# Patient Record
Sex: FEMALE | Race: BLACK OR AFRICAN AMERICAN | NOT HISPANIC OR LATINO | Employment: FULL TIME | URBAN - METROPOLITAN AREA
[De-identification: names, ages, dates, MRNs, and addresses within clinical notes are randomized per-mention and may not be internally consistent; named-entity substitution may affect disease eponyms.]

---

## 2022-06-23 ENCOUNTER — HOSPITAL ENCOUNTER (OUTPATIENT)
Dept: DATA CONVERSION | Facility: HOSPITAL | Age: 42
Discharge: HOME | End: 2022-06-23
Attending: EMERGENCY MEDICINE
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
## 2022-06-23 DIAGNOSIS — M54.50 LOW BACK PAIN, UNSPECIFIED: ICD-10-CM

## 2022-06-23 DIAGNOSIS — S39.012A STRAIN OF MUSCLE, FASCIA AND TENDON OF LOWER BACK, INITIAL ENCOUNTER: Primary | ICD-10-CM

## 2022-06-23 LAB — HCG UR QL: NEGATIVE

## 2023-05-09 ENCOUNTER — OFFICE VISIT (OUTPATIENT)
Dept: PRIMARY CARE | Facility: CLINIC | Age: 43
End: 2023-05-09
Payer: COMMERCIAL

## 2023-05-09 VITALS
SYSTOLIC BLOOD PRESSURE: 126 MMHG | HEART RATE: 88 BPM | WEIGHT: 214.13 LBS | BODY MASS INDEX: 32.56 KG/M2 | OXYGEN SATURATION: 100 % | DIASTOLIC BLOOD PRESSURE: 70 MMHG

## 2023-05-09 DIAGNOSIS — E66.09 CLASS 1 OBESITY DUE TO EXCESS CALORIES WITHOUT SERIOUS COMORBIDITY WITH BODY MASS INDEX (BMI) OF 32.0 TO 32.9 IN ADULT: ICD-10-CM

## 2023-05-09 DIAGNOSIS — G89.29 CHRONIC LOW BACK PAIN WITH SCIATICA, SCIATICA LATERALITY UNSPECIFIED, UNSPECIFIED BACK PAIN LATERALITY: Primary | ICD-10-CM

## 2023-05-09 DIAGNOSIS — K21.9 GASTROESOPHAGEAL REFLUX DISEASE WITHOUT ESOPHAGITIS: ICD-10-CM

## 2023-05-09 DIAGNOSIS — M54.40 CHRONIC LOW BACK PAIN WITH SCIATICA, SCIATICA LATERALITY UNSPECIFIED, UNSPECIFIED BACK PAIN LATERALITY: Primary | ICD-10-CM

## 2023-05-09 PROBLEM — R10.11 ABDOMINAL PAIN, RUQ: Status: ACTIVE | Noted: 2023-05-09

## 2023-05-09 PROBLEM — M51.369 DDD (DEGENERATIVE DISC DISEASE), LUMBAR: Status: ACTIVE | Noted: 2023-05-09

## 2023-05-09 PROBLEM — H92.03 OTALGIA, BILATERAL: Status: ACTIVE | Noted: 2023-05-09

## 2023-05-09 PROBLEM — N91.1 AMENORRHEA, SECONDARY: Status: ACTIVE | Noted: 2023-05-09

## 2023-05-09 PROBLEM — L29.9 EAR ITCH: Status: ACTIVE | Noted: 2023-05-09

## 2023-05-09 PROBLEM — N97.9 FEMALE INFERTILITY: Status: ACTIVE | Noted: 2023-05-09

## 2023-05-09 PROBLEM — H53.8 BLURRED VISION: Status: ACTIVE | Noted: 2023-05-09

## 2023-05-09 PROBLEM — N92.6 IRREGULAR MENSTRUAL CYCLE: Status: ACTIVE | Noted: 2023-05-09

## 2023-05-09 PROBLEM — S93.409A ANKLE SPRAIN: Status: ACTIVE | Noted: 2023-05-09

## 2023-05-09 PROBLEM — F41.1 ANXIETY, GENERALIZED: Status: ACTIVE | Noted: 2023-05-09

## 2023-05-09 PROBLEM — E83.51 HYPOCALCEMIA: Status: ACTIVE | Noted: 2023-05-09

## 2023-05-09 PROBLEM — L30.9 ECZEMATOUS DERMATITIS: Status: ACTIVE | Noted: 2023-05-09

## 2023-05-09 PROBLEM — M54.50 CHRONIC RIGHT-SIDED LOW BACK PAIN WITHOUT SCIATICA: Status: ACTIVE | Noted: 2023-05-09

## 2023-05-09 PROBLEM — M54.17 LUMBOSACRAL RADICULOPATHY AT S1: Status: ACTIVE | Noted: 2023-05-09

## 2023-05-09 PROBLEM — K58.9 IRRITABLE BOWEL SYNDROME WITHOUT DIARRHEA: Status: ACTIVE | Noted: 2023-05-09

## 2023-05-09 PROBLEM — M54.12 CERVICAL RADICULITIS: Status: ACTIVE | Noted: 2023-05-09

## 2023-05-09 PROBLEM — H61.23 BILATERAL IMPACTED CERUMEN: Status: ACTIVE | Noted: 2019-12-24

## 2023-05-09 PROBLEM — N64.4 BREAST PAIN, RIGHT: Status: ACTIVE | Noted: 2023-05-09

## 2023-05-09 PROBLEM — M79.18 MYOFASCIAL PAIN: Status: ACTIVE | Noted: 2023-05-09

## 2023-05-09 PROBLEM — M53.3 SACROILIAC JOINT DYSFUNCTION OF BOTH SIDES: Status: ACTIVE | Noted: 2023-05-09

## 2023-05-09 PROBLEM — K59.09 CHRONIC CONSTIPATION: Status: ACTIVE | Noted: 2023-05-09

## 2023-05-09 PROBLEM — M75.101 ROTATOR CUFF SYNDROME OF RIGHT SHOULDER: Status: ACTIVE | Noted: 2023-05-09

## 2023-05-09 PROBLEM — H92.09 REFERRED OTALGIA: Status: ACTIVE | Noted: 2019-12-24

## 2023-05-09 PROBLEM — M62.81 MUSCLE WEAKNESS (GENERALIZED): Status: ACTIVE | Noted: 2023-05-09

## 2023-05-09 PROBLEM — M26.629 TEMPOROMANDIBULAR JOINT-PAIN-DYSFUNCTION SYNDROME: Status: ACTIVE | Noted: 2019-12-24

## 2023-05-09 PROBLEM — S43.431A SUPERIOR GLENOID LABRUM LESION OF RIGHT SHOULDER: Status: ACTIVE | Noted: 2023-05-09

## 2023-05-09 PROBLEM — R92.8 ABNORMAL MAMMOGRAM: Status: ACTIVE | Noted: 2023-05-09

## 2023-05-09 PROBLEM — M75.51 SCAPULOTHORACIC BURSITIS OF RIGHT SHOULDER: Status: ACTIVE | Noted: 2023-05-09

## 2023-05-09 PROBLEM — G25.89 SCAPULAR DYSKINESIS: Status: ACTIVE | Noted: 2023-05-09

## 2023-05-09 PROBLEM — R70.0 ELEVATED SEDIMENTATION RATE: Status: ACTIVE | Noted: 2023-05-09

## 2023-05-09 PROBLEM — S43.439A: Status: ACTIVE | Noted: 2023-05-09

## 2023-05-09 PROBLEM — M51.36 DDD (DEGENERATIVE DISC DISEASE), LUMBAR: Status: ACTIVE | Noted: 2023-05-09

## 2023-05-09 PROBLEM — R76.8 ELEVATED ANTINUCLEAR ANTIBODY (ANA) LEVEL: Status: ACTIVE | Noted: 2023-05-09

## 2023-05-09 PROBLEM — R73.9 HYPERGLYCEMIA: Status: ACTIVE | Noted: 2023-05-09

## 2023-05-09 PROBLEM — M54.9 BACK PAIN: Status: ACTIVE | Noted: 2023-05-09

## 2023-05-09 PROBLEM — M25.511 PAIN IN RIGHT SHOULDER: Status: ACTIVE | Noted: 2023-05-09

## 2023-05-09 PROBLEM — J18.9 CAP (COMMUNITY ACQUIRED PNEUMONIA): Status: ACTIVE | Noted: 2023-05-09

## 2023-05-09 PROBLEM — M54.16 LEFT LUMBAR RADICULOPATHY: Status: ACTIVE | Noted: 2023-05-09

## 2023-05-09 PROBLEM — H61.21 EXCESSIVE CERUMEN IN RIGHT EAR CANAL: Status: ACTIVE | Noted: 2023-05-09

## 2023-05-09 PROBLEM — E55.9 VITAMIN D DEFICIENCY: Status: ACTIVE | Noted: 2023-05-09

## 2023-05-09 PROBLEM — N94.6 DYSMENORRHEA: Status: ACTIVE | Noted: 2023-05-09

## 2023-05-09 PROBLEM — R51.9 HEADACHE: Status: ACTIVE | Noted: 2023-05-09

## 2023-05-09 PROBLEM — R07.9 CHEST PAIN: Status: ACTIVE | Noted: 2023-05-09

## 2023-05-09 PROBLEM — R79.89 HIGH SERUM LOW-DENSITY LIPOPROTEIN (LDL): Status: ACTIVE | Noted: 2023-05-09

## 2023-05-09 PROBLEM — K59.00 CONSTIPATION: Status: ACTIVE | Noted: 2023-05-09

## 2023-05-09 PROBLEM — M25.572 LEFT ANKLE PAIN: Status: ACTIVE | Noted: 2023-05-09

## 2023-05-09 PROBLEM — N92.0 MENORRHAGIA WITH REGULAR CYCLE: Status: ACTIVE | Noted: 2023-05-09

## 2023-05-09 PROCEDURE — 99214 OFFICE O/P EST MOD 30 MIN: CPT | Performed by: INTERNAL MEDICINE

## 2023-05-09 PROCEDURE — 1036F TOBACCO NON-USER: CPT | Performed by: INTERNAL MEDICINE

## 2023-05-09 PROCEDURE — 3008F BODY MASS INDEX DOCD: CPT | Performed by: INTERNAL MEDICINE

## 2023-05-09 RX ORDER — IBUPROFEN 600 MG/1
TABLET ORAL EVERY 6 HOURS
COMMUNITY
Start: 2022-10-14 | End: 2023-08-07 | Stop reason: ALTCHOICE

## 2023-05-09 RX ORDER — CALCIUM CARBONATE 200(500)MG
TABLET,CHEWABLE ORAL
COMMUNITY
End: 2023-05-09

## 2023-05-09 RX ORDER — OMEPRAZOLE 20 MG/1
20 CAPSULE, DELAYED RELEASE ORAL DAILY
COMMUNITY
Start: 2022-11-25

## 2023-05-09 RX ORDER — SERTRALINE HYDROCHLORIDE 50 MG/1
TABLET, FILM COATED ORAL
COMMUNITY
Start: 2022-11-25 | End: 2023-05-09

## 2023-05-09 RX ORDER — NAPROXEN 500 MG/1
1 TABLET ORAL EVERY 12 HOURS
COMMUNITY
Start: 2022-06-03 | End: 2023-08-07

## 2023-05-09 RX ORDER — LIDOCAINE 560 MG/1
1 PATCH PERCUTANEOUS; TOPICAL; TRANSDERMAL
Qty: 5 PATCH | Refills: 0 | COMMUNITY
Start: 2023-05-07 | End: 2023-05-12

## 2023-05-09 RX ORDER — CLOMIPHENE CITRATE 50 MG/1
TABLET ORAL
COMMUNITY
Start: 2022-11-10 | End: 2023-05-09

## 2023-05-09 RX ORDER — DEXTROMETHORPHAN HYDROBROMIDE, GUAIFENESIN 5; 100 MG/5ML; MG/5ML
LIQUID ORAL EVERY 8 HOURS
COMMUNITY
Start: 2022-10-14 | End: 2023-05-09

## 2023-05-09 RX ORDER — GABAPENTIN 100 MG/1
1 CAPSULE ORAL 2 TIMES DAILY
COMMUNITY
Start: 2022-10-14 | End: 2024-04-18 | Stop reason: ALTCHOICE

## 2023-05-09 RX ORDER — DULOXETIN HYDROCHLORIDE 30 MG/1
60 CAPSULE, DELAYED RELEASE ORAL
Qty: 60 CAPSULE | Refills: 0 | COMMUNITY
Start: 2023-05-01 | End: 2023-08-07 | Stop reason: ALTCHOICE

## 2023-05-09 RX ORDER — FLUOCINOLONE ACETONIDE 0.11 MG/ML
OIL AURICULAR (OTIC)
COMMUNITY
Start: 2022-09-27 | End: 2023-05-09

## 2023-05-09 RX ORDER — IBUPROFEN 200 MG
TABLET ORAL 2 TIMES DAILY PRN
COMMUNITY

## 2023-05-09 RX ORDER — TRAZODONE HYDROCHLORIDE 50 MG/1
50 TABLET ORAL NIGHTLY
COMMUNITY
Start: 2022-11-25 | End: 2023-05-09

## 2023-05-09 RX ORDER — CYCLOBENZAPRINE HCL 10 MG
TABLET ORAL
COMMUNITY
Start: 2023-05-08

## 2023-05-09 RX ORDER — GABAPENTIN 300 MG/1
1 CAPSULE ORAL 3 TIMES DAILY
COMMUNITY
Start: 2022-07-07 | End: 2023-08-08 | Stop reason: SDUPTHER

## 2023-05-09 NOTE — PROGRESS NOTES
Subjective   Patient ID: Roula Song is a 42 y.o. female who presents for GERD and Muscle soreness (From shoulders to the back).  HPI    Patient presents for acute visit.  She complains of continued back pain with neuropathy.  She has completed physical therapy and is currently seeing pain management.  She was prescribed medications which she has not started yet.  No bowel or urinary incontinence    GERD stable on therapy no side effects    Diet and exercise reviewed    Review of Systems   All other systems reviewed and are negative.      Objective   /70   Pulse 88   Wt 97.1 kg (214 lb 2 oz)   SpO2 100%   BMI 32.56 kg/m²   Lab Results   Component Value Date    WBC 8.0 01/12/2023    HGB 11.6 (L) 01/12/2023    HCT 35.5 (L) 01/12/2023     01/12/2023    CHOL 223 (H) 01/12/2023    TRIG 83 01/12/2023    HDL 80.2 01/12/2023    ALT 11 01/12/2023    AST 14 01/12/2023     01/12/2023    K 4.0 01/12/2023     01/12/2023    CREATININE 0.81 01/12/2023    BUN 13 01/12/2023    CO2 30 01/12/2023    TSH 0.92 01/12/2023           Physical Exam  Vitals reviewed.   Constitutional:       Appearance: Normal appearance. She is obese.   HENT:      Head: Normocephalic and atraumatic.      Mouth/Throat:      Pharynx: No posterior oropharyngeal erythema.   Eyes:      General: No scleral icterus.     Conjunctiva/sclera: Conjunctivae normal.      Pupils: Pupils are equal, round, and reactive to light.   Cardiovascular:      Rate and Rhythm: Normal rate and regular rhythm.      Heart sounds: Normal heart sounds.   Pulmonary:      Effort: No respiratory distress.      Breath sounds: No wheezing.   Abdominal:      General: Abdomen is flat. Bowel sounds are normal. There is no distension.      Palpations: Abdomen is soft. There is no mass.      Tenderness: There is no abdominal tenderness. There is no rebound.   Musculoskeletal:         General: Normal range of motion.      Cervical back: Normal range of motion and  neck supple.   Skin:     General: Skin is warm and dry.   Neurological:      General: No focal deficit present.      Mental Status: She is alert and oriented to person, place, and time. Mental status is at baseline.   Psychiatric:         Mood and Affect: Mood normal.         Behavior: Behavior normal.         Thought Content: Thought content normal.         Judgment: Judgment normal.         Problem List Items Addressed This Visit          Digestive    GERD (gastroesophageal reflux disease)       Other    Back pain - Primary     Other Visit Diagnoses       Class 1 obesity due to excess calories without serious comorbidity with body mass index (BMI) of 32.0 to 32.9 in adult              Assessment/Plan     She complains of continued back pain with neuropathy.  She has completed physical therapy and is currently seeing pain management.  She was prescribed medications which she has not started yet.    GERD stable on therapy no side effects    Diet and exercise reviewed    Follow up pending

## 2023-08-07 ENCOUNTER — OFFICE VISIT (OUTPATIENT)
Dept: PRIMARY CARE | Facility: CLINIC | Age: 43
End: 2023-08-07
Payer: COMMERCIAL

## 2023-08-07 VITALS
HEART RATE: 107 BPM | DIASTOLIC BLOOD PRESSURE: 76 MMHG | WEIGHT: 219.4 LBS | OXYGEN SATURATION: 99 % | SYSTOLIC BLOOD PRESSURE: 118 MMHG | BODY MASS INDEX: 33.36 KG/M2

## 2023-08-07 DIAGNOSIS — G89.29 CHRONIC RIGHT SHOULDER PAIN: ICD-10-CM

## 2023-08-07 DIAGNOSIS — M51.37 DDD (DEGENERATIVE DISC DISEASE), LUMBOSACRAL: Primary | ICD-10-CM

## 2023-08-07 DIAGNOSIS — M25.511 CHRONIC RIGHT SHOULDER PAIN: ICD-10-CM

## 2023-08-07 DIAGNOSIS — J30.9 ALLERGIC RHINITIS, UNSPECIFIED SEASONALITY, UNSPECIFIED TRIGGER: ICD-10-CM

## 2023-08-07 DIAGNOSIS — K21.9 GASTROESOPHAGEAL REFLUX DISEASE WITHOUT ESOPHAGITIS: ICD-10-CM

## 2023-08-07 DIAGNOSIS — E66.09 CLASS 1 OBESITY DUE TO EXCESS CALORIES WITHOUT SERIOUS COMORBIDITY WITH BODY MASS INDEX (BMI) OF 33.0 TO 33.9 IN ADULT: ICD-10-CM

## 2023-08-07 PROBLEM — M48.061 SPINAL STENOSIS OF LUMBAR REGION WITHOUT NEUROGENIC CLAUDICATION: Status: ACTIVE | Noted: 2023-08-07

## 2023-08-07 PROBLEM — R22.1 NECK FULLNESS: Status: ACTIVE | Noted: 2023-08-07

## 2023-08-07 PROBLEM — O02.1 MISSED ABORTION (HHS-HCC): Status: ACTIVE | Noted: 2021-12-23

## 2023-08-07 PROBLEM — M51.26 HERNIATED LUMBAR INTERVERTEBRAL DISC: Status: ACTIVE | Noted: 2022-11-02

## 2023-08-07 PROBLEM — D64.9 ANEMIA: Status: ACTIVE | Noted: 2023-08-07

## 2023-08-07 PROBLEM — R59.9 ENLARGED LYMPH NODES, UNSPECIFIED: Status: ACTIVE | Noted: 2022-09-01

## 2023-08-07 PROBLEM — H60.311 ACUTE DIFFUSE OTITIS EXTERNA OF RIGHT EAR: Status: ACTIVE | Noted: 2023-08-07

## 2023-08-07 PROBLEM — E78.00 HYPERCHOLESTEROLEMIA: Status: ACTIVE | Noted: 2023-08-07

## 2023-08-07 PROBLEM — M54.16 CHRONIC LUMBAR RADICULOPATHY: Status: ACTIVE | Noted: 2023-08-07

## 2023-08-07 PROBLEM — M51.24 THORACIC DISC HERNIATION: Status: ACTIVE | Noted: 2023-08-07

## 2023-08-07 PROCEDURE — 3008F BODY MASS INDEX DOCD: CPT | Performed by: INTERNAL MEDICINE

## 2023-08-07 PROCEDURE — 1036F TOBACCO NON-USER: CPT | Performed by: INTERNAL MEDICINE

## 2023-08-07 PROCEDURE — 99214 OFFICE O/P EST MOD 30 MIN: CPT | Performed by: INTERNAL MEDICINE

## 2023-08-07 RX ORDER — NEOMYCIN SULFATE, POLYMYXIN B SULFATE AND HYDROCORTISONE 10; 3.5; 1 MG/ML; MG/ML; [USP'U]/ML
SUSPENSION/ DROPS AURICULAR (OTIC)
COMMUNITY
Start: 2023-07-22 | End: 2024-04-18 | Stop reason: SDUPTHER

## 2023-08-07 RX ORDER — DEXTROMETHORPHAN HYDROBROMIDE, GUAIFENESIN 5; 100 MG/5ML; MG/5ML
1 LIQUID ORAL AS NEEDED
COMMUNITY
Start: 2022-10-14

## 2023-08-07 RX ORDER — METHYLPREDNISOLONE 4 MG/1
TABLET ORAL
Qty: 21 TABLET | Refills: 0 | Status: SHIPPED | OUTPATIENT
Start: 2023-08-07 | End: 2024-04-18 | Stop reason: ALTCHOICE

## 2023-08-07 RX ORDER — FLUOCINOLONE ACETONIDE 0.11 MG/ML
OIL AURICULAR (OTIC)
COMMUNITY
Start: 2022-09-27 | End: 2024-04-18 | Stop reason: ALTCHOICE

## 2023-08-07 ASSESSMENT — ENCOUNTER SYMPTOMS: BACK PAIN: 1
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
## 2023-08-08 RX ORDER — GABAPENTIN 300 MG/1
300 CAPSULE ORAL 2 TIMES DAILY
Qty: 60 CAPSULE | Refills: 0 | Status: SHIPPED | OUTPATIENT
Start: 2023-08-08 | End: 2024-05-01

## 2023-10-27 ENCOUNTER — APPOINTMENT (OUTPATIENT)
Dept: NEUROLOGY | Facility: CLINIC | Age: 43
End: 2023-10-27
Payer: COMMERCIAL

## 2024-02-08 ENCOUNTER — APPOINTMENT (OUTPATIENT)
Dept: ORTHOPEDIC SURGERY | Facility: HOSPITAL | Age: 44
End: 2024-02-08
Payer: COMMERCIAL

## 2024-04-18 ENCOUNTER — OFFICE VISIT (OUTPATIENT)
Dept: PRIMARY CARE | Facility: CLINIC | Age: 44
End: 2024-04-18
Payer: COMMERCIAL

## 2024-04-18 VITALS
BODY MASS INDEX: 33.39 KG/M2 | DIASTOLIC BLOOD PRESSURE: 78 MMHG | WEIGHT: 219.6 LBS | HEART RATE: 103 BPM | SYSTOLIC BLOOD PRESSURE: 124 MMHG | OXYGEN SATURATION: 99 %

## 2024-04-18 DIAGNOSIS — M54.16 LEFT LUMBAR RADICULOPATHY: Primary | ICD-10-CM

## 2024-04-18 DIAGNOSIS — M25.511 CHRONIC RIGHT SHOULDER PAIN: ICD-10-CM

## 2024-04-18 DIAGNOSIS — E66.09 CLASS 1 OBESITY DUE TO EXCESS CALORIES WITHOUT SERIOUS COMORBIDITY WITH BODY MASS INDEX (BMI) OF 33.0 TO 33.9 IN ADULT: ICD-10-CM

## 2024-04-18 DIAGNOSIS — G89.29 CHRONIC RIGHT SHOULDER PAIN: ICD-10-CM

## 2024-04-18 DIAGNOSIS — N97.1 INFERTILITY OF TUBAL ORIGIN: ICD-10-CM

## 2024-04-18 DIAGNOSIS — H60.63 CHRONIC OTITIS EXTERNA OF BOTH EARS, UNSPECIFIED TYPE: ICD-10-CM

## 2024-04-18 PROBLEM — M67.919 DISORDER OF ROTATOR CUFF: Status: ACTIVE | Noted: 2024-04-18

## 2024-04-18 PROBLEM — R20.2 PARESTHESIA OF FOOT: Status: ACTIVE | Noted: 2024-04-18

## 2024-04-18 PROBLEM — E66.811 OBESITY, CLASS I, BMI 30-34.9: Status: ACTIVE | Noted: 2024-01-22

## 2024-04-18 PROBLEM — E66.9 OBESITY, CLASS I, BMI 30-34.9: Status: ACTIVE | Noted: 2024-01-22

## 2024-04-18 PROBLEM — K42.0 UMBILICAL HERNIA WITH OBSTRUCTION BUT NO GANGRENE: Status: ACTIVE | Noted: 2024-04-18

## 2024-04-18 PROBLEM — F43.22 ADJUSTMENT DISORDER WITH ANXIOUS MOOD: Status: ACTIVE | Noted: 2024-04-18

## 2024-04-18 PROBLEM — R53.1 ASTHENIA: Status: ACTIVE | Noted: 2024-04-18

## 2024-04-18 PROBLEM — O24.419 GESTATIONAL DIABETES (HHS-HCC): Status: ACTIVE | Noted: 2024-04-18

## 2024-04-18 PROBLEM — G81.90 HEMIPARESIS (MULTI): Status: ACTIVE | Noted: 2024-04-18

## 2024-04-18 PROBLEM — G62.9 NEUROPATHY: Status: ACTIVE | Noted: 2024-04-18

## 2024-04-18 PROBLEM — R06.00 DYSPNEA: Status: ACTIVE | Noted: 2024-04-18

## 2024-04-18 PROBLEM — E66.9 OBESITY DUE TO ENERGY IMBALANCE: Status: ACTIVE | Noted: 2024-04-18

## 2024-04-18 PROBLEM — N91.2 AMENORRHEA: Status: ACTIVE | Noted: 2023-05-09

## 2024-04-18 PROBLEM — N93.9 ABNORMAL VAGINAL BLEEDING: Status: ACTIVE | Noted: 2024-04-18

## 2024-04-18 PROBLEM — R10.9 STOMACH CRAMPS: Status: ACTIVE | Noted: 2024-04-18

## 2024-04-18 PROBLEM — R11.2 NAUSEA & VOMITING: Status: ACTIVE | Noted: 2024-04-18

## 2024-04-18 PROBLEM — H60.509 ACUTE OTITIS EXTERNA: Status: ACTIVE | Noted: 2023-08-07

## 2024-04-18 PROBLEM — F41.0 PANIC ATTACK: Status: ACTIVE | Noted: 2023-05-09

## 2024-04-18 PROBLEM — N39.0 URINARY TRACT INFECTION: Status: ACTIVE | Noted: 2024-04-18

## 2024-04-18 PROBLEM — J30.9 ALLERGIC RHINITIS: Status: ACTIVE | Noted: 2024-04-18

## 2024-04-18 PROBLEM — N92.0 MENSTRUAL SPOTTING: Status: ACTIVE | Noted: 2024-04-18

## 2024-04-18 PROBLEM — J32.9 SINUSITIS: Status: ACTIVE | Noted: 2024-04-18

## 2024-04-18 PROBLEM — S93.409A SPRAIN OF ANKLE: Status: ACTIVE | Noted: 2022-06-23

## 2024-04-18 PROBLEM — B01.9 VARICELLA: Status: ACTIVE | Noted: 2024-04-18

## 2024-04-18 PROCEDURE — 3074F SYST BP LT 130 MM HG: CPT | Performed by: INTERNAL MEDICINE

## 2024-04-18 PROCEDURE — 1036F TOBACCO NON-USER: CPT | Performed by: INTERNAL MEDICINE

## 2024-04-18 PROCEDURE — 3008F BODY MASS INDEX DOCD: CPT | Performed by: INTERNAL MEDICINE

## 2024-04-18 PROCEDURE — 99214 OFFICE O/P EST MOD 30 MIN: CPT | Performed by: INTERNAL MEDICINE

## 2024-04-18 PROCEDURE — 3078F DIAST BP <80 MM HG: CPT | Performed by: INTERNAL MEDICINE

## 2024-04-18 RX ORDER — PANTOPRAZOLE SODIUM 40 MG/1
40 TABLET, DELAYED RELEASE ORAL 2 TIMES DAILY
COMMUNITY
Start: 2024-03-04

## 2024-04-18 RX ORDER — NEOMYCIN SULFATE, POLYMYXIN B SULFATE AND HYDROCORTISONE 10; 3.5; 1 MG/ML; MG/ML; [USP'U]/ML
4 SUSPENSION/ DROPS AURICULAR (OTIC) 3 TIMES DAILY
Qty: 6 ML | Refills: 0 | Status: SHIPPED | OUTPATIENT
Start: 2024-04-18 | End: 2024-05-01

## 2024-04-18 NOTE — PROGRESS NOTES
Subjective   Patient ID: Roula Song is a 43 y.o. female who presents for Follow-up.  HPI    Same day sick    herniated disk with left leg neuropathy   She has completed PT with no significant improvement.    Spine surgery following  Not using Neurontin regularly    Increase Neurontin 300 mg twice daily.  Risk / benefits rev'd    Chronic rt shoulder pain    Ortho following    Otitis externa  Using neomycin / polymyxin / HC as directed    Infertility tubal issue  Seeing OB/GYN  Needs surgery / pt declined     GERD stable on therapy no side effects     Diet and exercise reviewed    Review of Systems   All other systems reviewed and are negative.      Objective   /78   Pulse 103   Wt 99.6 kg (219 lb 9.6 oz)   SpO2 99%   BMI 33.39 kg/m²   Lab Results   Component Value Date    WBC 8.0 01/12/2023    HGB 11.6 (L) 01/12/2023    HCT 35.5 (L) 01/12/2023     01/12/2023    CHOL 223 (H) 01/12/2023    TRIG 83 01/12/2023    HDL 80.2 01/12/2023    ALT 11 01/12/2023    AST 14 01/12/2023     01/12/2023    K 4.0 01/12/2023     01/12/2023    CREATININE 0.81 01/12/2023    BUN 13 01/12/2023    CO2 30 01/12/2023    TSH 0.92 01/12/2023    INR 0.9 07/28/2018    HGBA1C 5.3 11/28/2023           Physical Exam  Vitals reviewed.   Constitutional:       Appearance: Normal appearance. She is obese.   HENT:      Head: Normocephalic and atraumatic.      Mouth/Throat:      Pharynx: No posterior oropharyngeal erythema.   Eyes:      General: No scleral icterus.     Conjunctiva/sclera: Conjunctivae normal.      Pupils: Pupils are equal, round, and reactive to light.   Cardiovascular:      Rate and Rhythm: Normal rate and regular rhythm.      Heart sounds: Normal heart sounds.   Pulmonary:      Effort: No respiratory distress.      Breath sounds: No wheezing.   Abdominal:      General: Abdomen is flat. Bowel sounds are normal. There is no distension.      Palpations: Abdomen is soft. There is no mass.      Tenderness:  There is no abdominal tenderness. There is no rebound.   Musculoskeletal:         General: Normal range of motion.      Cervical back: Normal range of motion and neck supple.   Skin:     General: Skin is warm and dry.   Neurological:      General: No focal deficit present.      Mental Status: She is alert and oriented to person, place, and time. Mental status is at baseline.   Psychiatric:         Mood and Affect: Mood normal.         Behavior: Behavior normal.         Thought Content: Thought content normal.         Judgment: Judgment normal.         Problem List Items Addressed This Visit             ICD-10-CM    Left lumbar radiculopathy - Primary M54.16    Pain in right shoulder M25.511     Other Visit Diagnoses         Codes    Chronic otitis externa of both ears, unspecified type     H60.63    Relevant Medications    neomycin-polymyxin-HC (Cortisporin) 3.5-10,000-1 mg/mL-unit/mL-% otic suspension    Infertility of tubal origin     N97.1    Class 1 obesity due to excess calories without serious comorbidity with body mass index (BMI) of 33.0 to 33.9 in adult     E66.09, Z68.33          Assessment/Plan     Same day sick    herniated disk with left leg neuropathy   She has completed PT with no significant improvement.    Spine surgery following  Not using Neurontin regularly    Increase Neurontin 300 mg twice daily.  Risk / benefits rev'd    Chronic rt shoulder pain    Ortho following    Otitis externa  Using neomycin / polymyxin / HC as directed    Infertility tubal issue  Seeing OB/GYN  Needs surgery / pt declined     GERD stable on therapy no side effects     Diet and exercise reviewed    Follow up PCP near [place]

## 2024-05-01 ENCOUNTER — OFFICE VISIT (OUTPATIENT)
Dept: CARDIOLOGY | Facility: CLINIC | Age: 44
End: 2024-05-01
Payer: COMMERCIAL

## 2024-05-01 ENCOUNTER — TELEPHONE (OUTPATIENT)
Dept: CARDIOLOGY | Facility: CLINIC | Age: 44
End: 2024-05-01

## 2024-05-01 VITALS
HEIGHT: 67 IN | SYSTOLIC BLOOD PRESSURE: 133 MMHG | BODY MASS INDEX: 34.06 KG/M2 | WEIGHT: 217 LBS | HEART RATE: 93 BPM | TEMPERATURE: 98.2 F | DIASTOLIC BLOOD PRESSURE: 85 MMHG

## 2024-05-01 DIAGNOSIS — R07.9 CHEST PAIN, UNSPECIFIED TYPE: Primary | ICD-10-CM

## 2024-05-01 PROCEDURE — 3008F BODY MASS INDEX DOCD: CPT | Performed by: INTERNAL MEDICINE

## 2024-05-01 PROCEDURE — 3079F DIAST BP 80-89 MM HG: CPT | Performed by: INTERNAL MEDICINE

## 2024-05-01 PROCEDURE — 99204 OFFICE O/P NEW MOD 45 MIN: CPT | Performed by: INTERNAL MEDICINE

## 2024-05-01 PROCEDURE — 1036F TOBACCO NON-USER: CPT | Performed by: INTERNAL MEDICINE

## 2024-05-01 PROCEDURE — 99214 OFFICE O/P EST MOD 30 MIN: CPT | Performed by: INTERNAL MEDICINE

## 2024-05-01 PROCEDURE — 3075F SYST BP GE 130 - 139MM HG: CPT | Performed by: INTERNAL MEDICINE

## 2024-05-01 NOTE — PROGRESS NOTES
Chief Complaint:   Chest Pain     History of Present Illness     Roula Song is a 43 y.o. female presenting with chest pain.  The patient complains that for the past several years , they have experienced sharp back pain radiating to the precordial area which lasts 5 minutes.  The discomfort is exacerbated by none and relieved by none.  The discomfort is not changing.  The patient does not experience associated shortness of breath, nausea, vomiting but does have diaphoresis.  The pain is not positional and is not reproduced by palpation.  The patient has no history of known coronary artery disease.  The patient has not had a stress test within the last 12 months and has never had cardiac catheterization.  The patient does not exercise regularly and does not experience chest discomfort with exertion.  There is no history of aortic aneurysm or thromboembolism.  The patient denies any systemic complaints including fever. No risk factors for CAD.    Review of Systems  All pertinent systems have been reviewed and are negative except for what is stated in the history of present illness.    All other systems have been reviewed and are negative and noncontributory to this patient's current ailments.   .       Previous History     Past Medical History:  She has a past medical history of Body mass index (BMI) 34.0-34.9, adult (11/11/2021), Encounter for full-term uncomplicated delivery ([facility]), Other conditions influencing health status (01/30/2019), Pain in right foot (06/03/2019), Personal history of other diseases of the female genital tract (10/04/2021), Personal history of other diseases of the female genital tract (12/16/2020), and Varicella without complication.    Past Surgical History:  She has a past surgical history that includes Other surgical history (02/08/2019) and Other surgical history (02/08/2019).      Social History:  She reports that she has never smoked. She has never been exposed to tobacco  "smoke. She has never used smokeless tobacco. She reports that she does not drink alcohol and does not use drugs.    Family History:  No family history on file.     Allergies:  Patient has no known allergies.    Outpatient Medications:  Current Outpatient Medications   Medication Instructions    acetaminophen (Tylenol 8 HOUR) 650 mg ER tablet 1 tablet, oral, As needed    cyclobenzaprine (Flexeril) 10 mg tablet     gabapentin (NEURONTIN) 300 mg, oral, 2 times daily    ibuprofen 200 mg tablet oral, 2 times daily PRN    neomycin-polymyxin-HC (Cortisporin) 3.5-10,000-1 mg/mL-unit/mL-% otic suspension 4 drops, Each Ear, 3 times daily    omeprazole (PRILOSEC) 20 mg, oral, Daily    pantoprazole (PROTONIX) 40 mg, oral, 2 times daily       Physical Examination   Vitals:  Visit Vitals  /85 (BP Location: Right arm)   Pulse 93   Temp 36.8 °C (98.2 °F)   Ht 1.702 m (5' 7\")   Wt 98.4 kg (217 lb)   BMI 33.99 kg/m²   OB Status Having periods   Smoking Status Never   BSA 2.16 m²      Physical Exam  Vitals reviewed.   Constitutional:       General: She is not in acute distress.     Appearance: Normal appearance.   HENT:      Head: Normocephalic and atraumatic.      Nose: Nose normal.   Eyes:      Conjunctiva/sclera: Conjunctivae normal.   Cardiovascular:      Rate and Rhythm: Normal rate and regular rhythm.      Pulses: Normal pulses.      Heart sounds: No murmur heard.  Pulmonary:      Effort: Pulmonary effort is normal. No respiratory distress.      Breath sounds: Normal breath sounds. No wheezing, rhonchi or rales.   Abdominal:      General: Bowel sounds are normal. There is no distension.      Palpations: Abdomen is soft.      Tenderness: There is no abdominal tenderness.   Musculoskeletal:         General: No swelling.      Right lower leg: No edema.      Left lower leg: No edema.   Skin:     General: Skin is warm and dry.      Capillary Refill: Capillary refill takes less than 2 seconds.   Neurological:      General: No " focal deficit present.      Mental Status: She is alert.   Psychiatric:         Mood and Affect: Mood normal.            Labs/Imaging/Cardiac Studies     Last Labs:  CBC -  Lab Results   Component Value Date    WBC 8.0 01/12/2023    HGB 11.6 (L) 01/12/2023    HCT 35.5 (L) 01/12/2023    MCV 81 01/12/2023     01/12/2023       CMP -  Lab Results   Component Value Date    CALCIUM 9.3 01/12/2023    PROT 7.2 01/12/2023    ALBUMIN 4.0 01/12/2023    ALBUMIN 3.9 12/24/2020    AST 14 01/12/2023    ALT 11 01/12/2023    ALKPHOS 95 01/12/2023    BILITOT 0.4 01/12/2023       LIPID PANEL -   Lab Results   Component Value Date    CHOL 223 (H) 01/12/2023    HDL 80.2 01/12/2023    CHHDL 2.8 01/12/2023    VLDL 17 01/12/2023    TRIG 83 01/12/2023       RENAL FUNCTION PANEL -   Lab Results   Component Value Date    K 4.0 01/12/2023       Lab Results   Component Value Date    HGBA1C 5.3 11/28/2023       ECG:    Echo:  No echocardiogram results found for the past 12 months       Assessment and Recommendations     Assessment/Plan     1. Chest pain, unspecified type  The patient presents with atypical, chest pain.  The differential diagnosis includes CAD, gastrointestinal, pulmonary, and musculoskeletal etiologies.  There is no clinical evidence to suggest acute coronary syndrome, aortic dissection, or pulmonary embolism.  An outpatient evaluation of the patient's chest pain is appropriate with a stress test which will be scheduled as soon as can be arranged. For severe and/or prolonged chest pain, the patient was instructed to call 911.     - Stress Test; Future         Jonah Pal MD    Exclusive of any other services or procedures performed, I, Jonah Pal MD , spent 30 minutes in duration for this visit today.  This time consisted of chart review, obtaining history, and/or performing the exam as documented above as well as documenting the clinical information for the encounter in the electronic record, discussing treatment  options, plans, and/or goals with patient, family, and/or caregiver, refilling medications, updating the electronic record, ordering medicines, lab work, imaging, referrals, and/or procedures as documented above and communicating with other [facility] professionals. I have discussed the results of laboratory, radiology, and cardiology studies with the patient and their family/caregiver.

## 2024-05-06 ENCOUNTER — HOSPITAL ENCOUNTER (OUTPATIENT)
Dept: CARDIOLOGY | Facility: CLINIC | Age: 44
Discharge: HOME | End: 2024-05-06
Payer: COMMERCIAL

## 2024-05-06 VITALS
BODY MASS INDEX: 34.37 KG/M2 | SYSTOLIC BLOOD PRESSURE: 141 MMHG | HEART RATE: 104 BPM | HEIGHT: 67 IN | DIASTOLIC BLOOD PRESSURE: 91 MMHG | WEIGHT: 219 LBS

## 2024-05-06 DIAGNOSIS — R07.9 CHEST PAIN, UNSPECIFIED TYPE: ICD-10-CM

## 2024-05-06 PROCEDURE — 93016 CV STRESS TEST SUPVJ ONLY: CPT | Performed by: INTERNAL MEDICINE

## 2024-05-06 PROCEDURE — 93018 CV STRESS TEST I&R ONLY: CPT | Performed by: INTERNAL MEDICINE

## 2024-05-06 PROCEDURE — 93017 CV STRESS TEST TRACING ONLY: CPT

## 2024-05-06 PROCEDURE — 93350 STRESS TTE ONLY: CPT | Performed by: INTERNAL MEDICINE

## 2024-08-02 ENCOUNTER — PATIENT MESSAGE (OUTPATIENT)
Dept: OBSTETRICS AND GYNECOLOGY | Facility: CLINIC | Age: 44
End: 2024-08-02
Payer: COMMERCIAL

## 2024-08-02 DIAGNOSIS — Z12.31 VISIT FOR SCREENING MAMMOGRAM: Primary | ICD-10-CM

## 2024-08-09 ENCOUNTER — APPOINTMENT (OUTPATIENT)
Dept: RADIOLOGY | Facility: HOSPITAL | Age: 44
End: 2024-08-09
Payer: COMMERCIAL

## 2024-08-09 ENCOUNTER — TELEPHONE (OUTPATIENT)
Dept: OBSTETRICS AND GYNECOLOGY | Facility: CLINIC | Age: 44
End: 2024-08-09
Payer: COMMERCIAL

## 2024-08-09 ENCOUNTER — HOSPITAL ENCOUNTER (OUTPATIENT)
Dept: RADIOLOGY | Facility: HOSPITAL | Age: 44
Discharge: HOME | End: 2024-08-09
Payer: COMMERCIAL

## 2024-08-09 VITALS — BODY MASS INDEX: 32.96 KG/M2 | WEIGHT: 210 LBS | HEIGHT: 67 IN

## 2024-08-09 DIAGNOSIS — R92.8 OTHER ABNORMAL AND INCONCLUSIVE FINDINGS ON DIAGNOSTIC IMAGING OF BREAST: ICD-10-CM

## 2024-08-09 DIAGNOSIS — R92.8 ABNORMAL MAMMOGRAM: ICD-10-CM

## 2024-08-09 DIAGNOSIS — R92.8 ABNORMAL MAMMOGRAM: Primary | ICD-10-CM

## 2024-08-09 PROCEDURE — 77062 BREAST TOMOSYNTHESIS BI: CPT

## 2024-08-09 PROCEDURE — 76642 ULTRASOUND BREAST LIMITED: CPT | Mod: RT

## 2024-08-09 PROCEDURE — 76983 USE EA ADDL TARGET LESION: CPT | Mod: RT

## 2024-11-15 ENCOUNTER — APPOINTMENT (OUTPATIENT)
Dept: OBSTETRICS AND GYNECOLOGY | Facility: CLINIC | Age: 44
End: 2024-11-15
Payer: COMMERCIAL

## 2024-11-15 VITALS
HEIGHT: 68 IN | BODY MASS INDEX: 33.34 KG/M2 | SYSTOLIC BLOOD PRESSURE: 124 MMHG | WEIGHT: 220 LBS | DIASTOLIC BLOOD PRESSURE: 70 MMHG

## 2024-11-15 DIAGNOSIS — Z01.419 WELL WOMAN EXAM: ICD-10-CM

## 2024-11-15 DIAGNOSIS — N94.6 DYSMENORRHEA, UNSPECIFIED: Primary | ICD-10-CM

## 2024-11-15 PROCEDURE — 99386 PREV VISIT NEW AGE 40-64: CPT | Performed by: OBSTETRICS & GYNECOLOGY

## 2024-11-15 PROCEDURE — 3078F DIAST BP <80 MM HG: CPT | Performed by: OBSTETRICS & GYNECOLOGY

## 2024-11-15 PROCEDURE — 1036F TOBACCO NON-USER: CPT | Performed by: OBSTETRICS & GYNECOLOGY

## 2024-11-15 PROCEDURE — 87624 HPV HI-RISK TYP POOLED RSLT: CPT

## 2024-11-15 PROCEDURE — 3008F BODY MASS INDEX DOCD: CPT | Performed by: OBSTETRICS & GYNECOLOGY

## 2024-11-15 PROCEDURE — 3074F SYST BP LT 130 MM HG: CPT | Performed by: OBSTETRICS & GYNECOLOGY

## 2024-11-15 ASSESSMENT — PAIN SCALES - GENERAL: PAINLEVEL_OUTOF10: 0-NO PAIN

## 2024-11-15 NOTE — PROGRESS NOTES
Subjective   Patient ID: Roula Song is a 44 y.o. female who presents for Well Women Visit (New PT is here for Annual Pap.  PT had Mammogram done 8/2024. PT declined Chaperone.).  HPI  As contained in the chief complaint.  Patient reports dysmenorrhea-chronic.  Ultrasound dated 1/10/2023 at [facility] reviewed  Review of Systems  10 systems have been reviewed and are negative and noncontributory to the patient current ailments.    Objective   Physical Exam  Vital signs reviewed    CONSTITUTIONAL- well nourished, well developed, looks like stated age.  She is sitting comfortably on the exam table in no apparent distress  SKIN- normal skin color and pigmentation no visible lesions  EYES- normal external exam  THYROID- symmetrical ,normal size and normal consistency  HEART- RRR without murmur, S3 or S4.  LUNGS- breathing comfortably, no dyspnea  EXTREMITIES- no deformities.  Edema, discoloration, or pain in BLE  NEUROLOGICAL- A/Ox3, conversational   PSYCHIATRIC- alert, pleasant and cordial, age-appropriate, not anxious, or depressed appearing  BREASTS-normal appearance, no skin changes or nipple discharge.  Palpation of the breast and axillae: No palpable mass and no axillary lymphadenopathy  ABDOMEN- soft, non distended, bowel sound normal pitch and intensity,no palpable abnormal masses  GENITOURINARY- External genitalia: Normal.                                 - Uterus: AV/AF NSSC, mobile and nontender                                -The adnexal areas are free of tenderness or mass                                -There are no cervical lesions; there is no cervical motion tenderness                                -Vagina without lesions, mucosa pink and well-hydrated, discharge is physiologic.                                   Inspection of Perianal Area: Normal    Assessment/Plan   Diagnoses and all orders for this visit:  Dysmenorrhea, unspecified  Well woman exam  -     THINPREP PAP TEST  -Discussed the use of  NSAIDs and hormonal contraceptives.  Patient not in favor of taking medication.  -[facility] referral       Emiliano Parra DO 11/15/24 10:48 AM
